# Patient Record
Sex: FEMALE | Race: WHITE | NOT HISPANIC OR LATINO | URBAN - METROPOLITAN AREA
[De-identification: names, ages, dates, MRNs, and addresses within clinical notes are randomized per-mention and may not be internally consistent; named-entity substitution may affect disease eponyms.]

---

## 2019-09-11 ENCOUNTER — OFFICE (OUTPATIENT)
Dept: URBAN - METROPOLITAN AREA CLINIC 101 | Facility: CLINIC | Age: 69
End: 2019-09-11
Payer: COMMERCIAL

## 2019-09-11 VITALS
DIASTOLIC BLOOD PRESSURE: 68 MMHG | HEIGHT: 65 IN | TEMPERATURE: 98.6 F | HEART RATE: 64 BPM | SYSTOLIC BLOOD PRESSURE: 129 MMHG | WEIGHT: 187 LBS

## 2019-09-11 DIAGNOSIS — R19.5 OTHER FECAL ABNORMALITIES: ICD-10-CM

## 2019-09-11 DIAGNOSIS — Z86.010 PERSONAL HISTORY OF COLONIC POLYPS: ICD-10-CM

## 2019-09-11 DIAGNOSIS — E03.9 HYPOTHYROIDISM, UNSPECIFIED: ICD-10-CM

## 2019-09-11 DIAGNOSIS — K59.09 OTHER CONSTIPATION: ICD-10-CM

## 2019-09-11 PROCEDURE — 99204 OFFICE O/P NEW MOD 45 MIN: CPT

## 2019-09-11 RX ORDER — POLYETHYLENE GLYCOL 3350 17 G/17G
POWDER, FOR SOLUTION ORAL
Qty: 1 | Refills: 0 | Status: ACTIVE
Start: 2019-09-11

## 2019-09-11 NOTE — SERVICEHPINOTES
MEGAN HUMPHREYS   is a   69   year old    female who is being seen in consultation at the request of   TRINI  VON ELTEN   for + FOBT found by PCP around end of 08/2019. She mentions long h/o constipation manifesting with BMs every 3 days, BSS type 1 to 4 with straining. She has tried OTC swiss nivia laxative herbal that is used several times a month that helps but can cause abdominal cramping. Prior trial of Metamucil that did not help. No trial of Miralax. She mentions h/o hypothyroidism and recent increase in rx Levothyroxine due to 08/07/19 labs showing very elevated TSH at 6, so she suspect this is contributing to her constipation. No known cardiac or pulmonary disease. Rare NSAID use. Denies fevers, n/v, upper abdominal pain, melena, blood in stool, weight loss.

## 2019-11-06 ENCOUNTER — ON CAMPUS - OUTPATIENT (OUTPATIENT)
Dept: URBAN - METROPOLITAN AREA HOSPITAL 14 | Facility: HOSPITAL | Age: 69
End: 2019-11-06
Payer: COMMERCIAL

## 2019-11-06 DIAGNOSIS — D12.8 BENIGN NEOPLASM OF RECTUM: ICD-10-CM

## 2019-11-06 DIAGNOSIS — K63.5 POLYP OF COLON: ICD-10-CM

## 2019-11-06 DIAGNOSIS — K59.09 OTHER CONSTIPATION: ICD-10-CM

## 2019-11-06 DIAGNOSIS — D12.4 BENIGN NEOPLASM OF DESCENDING COLON: ICD-10-CM

## 2019-11-06 DIAGNOSIS — R19.5 OTHER FECAL ABNORMALITIES: ICD-10-CM

## 2019-11-06 PROCEDURE — 45380 COLONOSCOPY AND BIOPSY: CPT | Mod: 59

## 2019-11-06 PROCEDURE — 45385 COLONOSCOPY W/LESION REMOVAL: CPT
